# Patient Record
Sex: FEMALE | Race: WHITE | NOT HISPANIC OR LATINO | URBAN - METROPOLITAN AREA
[De-identification: names, ages, dates, MRNs, and addresses within clinical notes are randomized per-mention and may not be internally consistent; named-entity substitution may affect disease eponyms.]

---

## 2020-09-27 ENCOUNTER — EMERGENCY (EMERGENCY)
Facility: HOSPITAL | Age: 35
LOS: 1 days | Discharge: HOME | End: 2020-09-27
Attending: EMERGENCY MEDICINE
Payer: COMMERCIAL

## 2020-09-27 VITALS
OXYGEN SATURATION: 100 % | HEART RATE: 108 BPM | HEIGHT: 69 IN | SYSTOLIC BLOOD PRESSURE: 115 MMHG | TEMPERATURE: 99 F | RESPIRATION RATE: 18 BRPM | WEIGHT: 199.96 LBS | DIASTOLIC BLOOD PRESSURE: 75 MMHG

## 2020-09-27 DIAGNOSIS — N93.9 ABNORMAL UTERINE AND VAGINAL BLEEDING, UNSPECIFIED: ICD-10-CM

## 2020-09-27 LAB
ANION GAP SERPL CALC-SCNC: 10 MMOL/L — SIGNIFICANT CHANGE UP (ref 5–17)
APPEARANCE UR: CLEAR — SIGNIFICANT CHANGE UP
APTT BLD: 27.8 SEC — SIGNIFICANT CHANGE UP (ref 27.5–35.5)
BACTERIA # UR AUTO: NEGATIVE — SIGNIFICANT CHANGE UP
BASOPHILS # BLD AUTO: 0.05 K/UL — SIGNIFICANT CHANGE UP (ref 0–0.2)
BASOPHILS NFR BLD AUTO: 0.6 % — SIGNIFICANT CHANGE UP (ref 0–2)
BILIRUB UR-MCNC: NEGATIVE — SIGNIFICANT CHANGE UP
BLD GP AB SCN SERPL QL: NEGATIVE — SIGNIFICANT CHANGE UP
BUN SERPL-MCNC: 16 MG/DL — SIGNIFICANT CHANGE UP (ref 7–23)
CALCIUM SERPL-MCNC: 8.7 MG/DL — SIGNIFICANT CHANGE UP (ref 8.4–10.5)
CHLORIDE SERPL-SCNC: 105 MMOL/L — SIGNIFICANT CHANGE UP (ref 96–108)
CO2 SERPL-SCNC: 23 MMOL/L — SIGNIFICANT CHANGE UP (ref 22–31)
COLOR SPEC: YELLOW — SIGNIFICANT CHANGE UP
CREAT SERPL-MCNC: 0.99 MG/DL — SIGNIFICANT CHANGE UP (ref 0.5–1.3)
DIFF PNL FLD: ABNORMAL
EOSINOPHIL # BLD AUTO: 0.23 K/UL — SIGNIFICANT CHANGE UP (ref 0–0.5)
EOSINOPHIL NFR BLD AUTO: 2.9 % — SIGNIFICANT CHANGE UP (ref 0–6)
EPI CELLS # UR: 1 /HPF — SIGNIFICANT CHANGE UP
GLUCOSE SERPL-MCNC: 99 MG/DL — SIGNIFICANT CHANGE UP (ref 70–99)
GLUCOSE UR QL: NEGATIVE — SIGNIFICANT CHANGE UP
HCG UR QL: NEGATIVE — SIGNIFICANT CHANGE UP
HCT VFR BLD CALC: 23.5 % — LOW (ref 34.5–45)
HGB BLD-MCNC: 7.6 G/DL — LOW (ref 11.5–15.5)
HYALINE CASTS # UR AUTO: 0 /LPF — SIGNIFICANT CHANGE UP (ref 0–7)
IMM GRANULOCYTES NFR BLD AUTO: 0.4 % — SIGNIFICANT CHANGE UP (ref 0–1.5)
INR BLD: 1.18 RATIO — HIGH (ref 0.88–1.16)
KETONES UR-MCNC: NEGATIVE — SIGNIFICANT CHANGE UP
LEUKOCYTE ESTERASE UR-ACNC: NEGATIVE — SIGNIFICANT CHANGE UP
LYMPHOCYTES # BLD AUTO: 2.55 K/UL — SIGNIFICANT CHANGE UP (ref 1–3.3)
LYMPHOCYTES # BLD AUTO: 32.4 % — SIGNIFICANT CHANGE UP (ref 13–44)
MCHC RBC-ENTMCNC: 29.8 PG — SIGNIFICANT CHANGE UP (ref 27–34)
MCHC RBC-ENTMCNC: 32.3 GM/DL — SIGNIFICANT CHANGE UP (ref 32–36)
MCV RBC AUTO: 92.2 FL — SIGNIFICANT CHANGE UP (ref 80–100)
MONOCYTES # BLD AUTO: 0.43 K/UL — SIGNIFICANT CHANGE UP (ref 0–0.9)
MONOCYTES NFR BLD AUTO: 5.5 % — SIGNIFICANT CHANGE UP (ref 2–14)
NEUTROPHILS # BLD AUTO: 4.57 K/UL — SIGNIFICANT CHANGE UP (ref 1.8–7.4)
NEUTROPHILS NFR BLD AUTO: 58.2 % — SIGNIFICANT CHANGE UP (ref 43–77)
NITRITE UR-MCNC: NEGATIVE — SIGNIFICANT CHANGE UP
NRBC # BLD: 0 /100 WBCS — SIGNIFICANT CHANGE UP (ref 0–0)
PH UR: 6 — SIGNIFICANT CHANGE UP (ref 5–8)
PLATELET # BLD AUTO: 246 K/UL — SIGNIFICANT CHANGE UP (ref 150–400)
POTASSIUM SERPL-MCNC: 3.9 MMOL/L — SIGNIFICANT CHANGE UP (ref 3.5–5.3)
POTASSIUM SERPL-SCNC: 3.9 MMOL/L — SIGNIFICANT CHANGE UP (ref 3.5–5.3)
PROT UR-MCNC: ABNORMAL
PROTHROM AB SERPL-ACNC: 13.9 SEC — HIGH (ref 10.6–13.6)
RBC # BLD: 2.55 M/UL — LOW (ref 3.8–5.2)
RBC # FLD: 11.8 % — SIGNIFICANT CHANGE UP (ref 10.3–14.5)
RBC CASTS # UR COMP ASSIST: 217 /HPF — HIGH (ref 0–4)
RH IG SCN BLD-IMP: NEGATIVE — SIGNIFICANT CHANGE UP
RH IG SCN BLD-IMP: NEGATIVE — SIGNIFICANT CHANGE UP
SODIUM SERPL-SCNC: 138 MMOL/L — SIGNIFICANT CHANGE UP (ref 135–145)
SP GR SPEC: 1.03 — HIGH (ref 1.01–1.02)
UROBILINOGEN FLD QL: NEGATIVE — SIGNIFICANT CHANGE UP
WBC # BLD: 7.86 K/UL — SIGNIFICANT CHANGE UP (ref 3.8–10.5)
WBC # FLD AUTO: 7.86 K/UL — SIGNIFICANT CHANGE UP (ref 3.8–10.5)
WBC UR QL: 1 /HPF — SIGNIFICANT CHANGE UP (ref 0–5)

## 2020-09-27 PROCEDURE — 99220: CPT

## 2020-09-27 PROCEDURE — 99284 EMERGENCY DEPT VISIT MOD MDM: CPT

## 2020-09-27 PROCEDURE — 99244 OFF/OP CNSLTJ NEW/EST MOD 40: CPT

## 2020-09-27 PROCEDURE — 76830 TRANSVAGINAL US NON-OB: CPT | Mod: 26

## 2020-09-27 RX ORDER — ACETAMINOPHEN 500 MG
650 TABLET ORAL ONCE
Refills: 0 | Status: COMPLETED | OUTPATIENT
Start: 2020-09-27 | End: 2020-09-27

## 2020-09-27 RX ADMIN — Medication 650 MILLIGRAM(S): at 21:27

## 2020-09-27 RX ADMIN — Medication 650 MILLIGRAM(S): at 22:30

## 2020-09-27 NOTE — CONSULT NOTE ADULT - PROBLEM SELECTOR RECOMMENDATION 9
Plan  -transfuse 1u pRBC  -f/u post-transfusion CBC   -d/c home with OCP taper: 3 pills once a day for 3 days; 2 pills once a day for 3 days, 1 pill once a day until patient is seen by her OBGYN  -patient to follow up with Dr. Cardona within 1-2 weeks for further outpatient management of abnormal uterine bleeding   -continue management per ED    Pt seen and counseled with Dr. Chan Robyn Frankel PGY-2

## 2020-09-27 NOTE — ED CDU PROVIDER DISPOSITION NOTE - PATIENT PORTAL LINK FT
You can access the FollowMyHealth Patient Portal offered by [facility] by registering at the following website: [email or web address]. By joining [facility]’s FollowMyHealth portal, you will also be able to view your health information using other applications (apps) compatible with our system.

## 2020-09-27 NOTE — ED PROVIDER NOTE - PROGRESS NOTE DETAILS
Spoke with patient regarding finding of fibroid on TVUS - pt understands. Will alert need for transfusion due to low hemoglobin. OB paged. Waiting for callback.   -Cameron Lopez PA-C Spoke with patient regarding finding of fibroid on TVUS - pt understands. Alerted need for transfusion due to low hemoglobin, which agrees and understands. OB paged. Waiting for callback.   -Cameron Lopez PA-C Spoke with OB - will come see pt in ED. Pt sees Dr. Demetra Valadez for GYN outpt.  -Cameron Lopez PA-C

## 2020-09-27 NOTE — ED ADULT NURSE REASSESSMENT NOTE - COMFORT CARE
ambulated to bathroom/po fluids offered/repositioned/plan of care explained/warm blanket provided/darkened lights

## 2020-09-27 NOTE — ED CDU PROVIDER INITIAL DAY NOTE - PROGRESS NOTE DETAILS
CDU PROGRESS NOTE ROLANDO SINGH: Pt arrived in CDU, currently receiving 1st unit PRBC. Pt resting in stretcher in NAD. Case/plan reviewed. Pt without complaints. Will continue to monitor overnight. CDU PROGRESS NOTE PA FRANCISCO: Pt resting comfortably, c/o headache 5/10 in severity. Patient reports having to change pad once since ED transfer to CDU. Will give Tylenol 650mg po and continue to monitor, repeat post transfusion H/H. CDU PROGRESS NOTE ROLANDO SINGH: Pt completed 1 unit PRBC. Will repeat H/H in am.

## 2020-09-27 NOTE — ED PROVIDER NOTE - PHYSICAL EXAMINATION
BRENDON Lopez PA-C see below:  GEN: NAD, awake, eyes open spontaneously  HEENT: NCAT, MMM, Trachea midline, normal conjunctiva, perrl  CHEST/LUNGS: Non-tachypneic, CTAB, bilateral breath sounds  CARDIAC: Non-tachycardic, normal perfusion  ABDOMEN: Soft, +TTP throughout abdomen, nondistended, No rebound/guarding  MSK: No edema, no gross deformity of extremities  SKIN: No rashes, no petechiae, no vesicles  NEURO: CN grossly intact, normal coordination, no focal motor or sensory deficits  PSYCH: Alert, appropriate, cooperative, with capacity and insight   ---------------------------------------------------------------------------- BRENDON Lopez PA-C see below:  GEN: NAD, awake, eyes open spontaneously  HEENT: NCAT, MMM, Trachea midline, normal conjunctiva, perrl  CHEST/LUNGS: Non-tachypneic, CTAB, bilateral breath sounds  CARDIAC: Non-tachycardic, normal perfusion  ABDOMEN: Soft, +TTP throughout abdomen, nondistended, No rebound/guarding  PELVIC: Chaperone: Valencia Dean RN. External genitalia unremarkable, no erythema, no vesicles, no lesions, + mild vaginal bleeding at cervix, cervix w/o erythema, non-friable, nonparous os. No CMT, adnexa not palpable, no cysts or masses. Exam was well tolerated.   MSK: No edema, no gross deformity of extremities  SKIN: No rashes, no petechiae, no vesicles  NEURO: CN grossly intact, normal coordination, no focal motor or sensory deficits  PSYCH: Alert, appropriate, cooperative, with capacity and insight   ----------------------------------------------------------------------------

## 2020-09-27 NOTE — CONSULT NOTE ADULT - SUBJECTIVE AND OBJECTIVE BOX
**INCOMPLETE**    R2 GYN CONSULT NOTE    EUGENE PRATT  35y  Female [id number]    HPI:36yo GP LMP presenting with vaginal bleeding x5 days.         Primary OBGYN: Dr. Farley    POB:    PGYN: Denies fibroids, cysts, endometriosis, STI's, Abnormal pap smears   PMH:   PSH:   MEDS:  ALL: No Known Allergies  SOCHx: neg x 3  FH:   No h/o VTE. No h/o familial or gyn cancers (no breast, ovarian, uterine, gastric, pancreatic, skin, or eye. FAMILY HISTORY:      ROS: neg except as noted in [facility]    Hospital Meds:   MEDICATIONS  (STANDING):    MEDICATIONS  (PRN):        Vital Signs Last 24 Hrs  T(C): 37.3 (27 Sep 2020 13:46), Max: 37.3 (27 Sep 2020 13:46)  T(F): 99.1 (27 Sep 2020 13:46), Max: 99.1 (27 Sep 2020 13:46)  HR: 108 (27 Sep 2020 13:46) (108 - 108)  BP: 115/75 (27 Sep 2020 13:46) (115/75 - 115/75)  BP(mean): --  RR: 18 (27 Sep 2020 13:46) (18 - 18)  SpO2: 100% (27 Sep 2020 13:46) (100% - 100%)    Physical Exam:   General: sitting comfortably in bed, in NAD   HEENT: neck supple, full ROM, moist mucuous membranes  CV: RRR, nl S1S2 no m/r/g  Lungs: CTA b/l, good air flow b/l   Back: No CVA tenderness  Abd: Soft, non-tender, non-distended.  Normoactive bowel sounds.  : No bleeding on pad. External labia wnl.    Speculum Exam: Scant blood in vault. No active bleeding from os.  Physiologic discharge.    Bimanual Exam: No cervical motion tenderness. Uterus wnl. Adnexae nonpalpable bilaterally. Cervix closed v Cervix dilated to XXX  Ext: non-tender b/l, no edema     LABS:              Pregnancy Profile, Urine: Negative (20 @ 16:06)                          7.6    7.86  )-----------( 246      ( 27 Sep 2020 14:36 )             23.5         138  |  105  |  16  ----------------------------<  99  3.9   |  23  |  0.99    Ca    8.7      27 Sep 2020 14:36      I&O's Detail    PT/INR - ( 27 Sep 2020 14:36 )   PT: 13.9 sec;   INR: 1.18 ratio         PTT - ( 27 Sep 2020 14:36 )  PTT:27.8 sec  Urinalysis Basic - ( 27 Sep 2020 16:06 )    Color: Yellow / Appearance: Clear / S.028 / pH: x  Gluc: x / Ketone: Negative  / Bili: Negative / Urobili: Negative   Blood: x / Protein: 30 mg/dL / Nitrite: Negative   Leuk Esterase: Negative / RBC: 217 /hpf / WBC 1 /HPF   Sq Epi: x / Non Sq Epi: 1 /hpf / Bacteria: Negative        RADIOLOGY & ADDITIONAL STUDIES:    EXAM:  US TRANSVAGINAL                            PROCEDURE DATE:  2020            INTERPRETATION:  CLINICAL INFORMATION: Heavy vaginal bleeding and clots. Dizziness and fatigue.    LMP: 2 weeks ago    COMPARISON: None available.    TECHNIQUE:  Endovaginal pelvic sonogram as per order. Transabdominal pelvic sonogram was also performed as part of our standard protocol. Color and Spectral Doppler was performed.    FINDINGS:    Uterus: 6.9 x 3.4 x 4.4 cm. Left anterior intramural uterine fibroid measures 1.4 x 1.0 x 1.4 cm. Hypoechoic rounded focus along the right broad ligament measuring 1.1 x 1.1 x 0.9 cm, likely fibroid and less likely ectopic pregnancy.  Endometrium: 9 mm. Hyperechoic material in the endometrial canal, likely hemorrhage. No definite focal lesions.    Right ovary: 4.5 x 2.4 x 2.5 cm. Corpus luteum measures 2.3 x 1.5 x 1.7. Normal arterial and venous waveforms.  Left ovary: 2.6 x 1.6 x 1.6 cm. Within normal limits. Normal arterial and venous waveforms.    Fluid: Trace free fluid.    IMPRESSION:  Hypoechoic rounded focus along the right broad ligament measuring 1.1 x 1.1 x 0.9 cm, likely fibroid and less likely ectopic pregnancy. Correlate with pending beta hCG.    Blood within the endometrial canal.              SAVANNAH CHANG M.D., RADIOLOGY RESIDENT  This document has been electronically signed.  JALEEL ROSSI M.D., ATTENDING RADIOLOGIST  This document has been electronically signed. Sep 27 2020  3:53PM R2 GYN CONSULT NOTE    EUGENE PRATT  35y  Female [id number]    HPI: 34yo  LMP  presenting with vaginal bleeding x5 days. The patient states she has been passing large, fist-sized clots. She has been using 1 pad/hr. She has also had right sided cramping pelvic pain for the same duration. She has taken Advil and Midol with good pain relief. She endorses fatigue, lightheadedness, dizziness, and nausea. Has not had any emesis. Denies fevers, chills, chest pain, SOB, syncope, dysuria, or vaginal discharge. She was seen at her private OBGYN last week, where she had a brief ultrasound; she was told she had a right sided ovarian cyst and was sent home.     Primary OBGYN: Dr. 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; denies fibroids, endometriosis, Abnormal pap smears   PMH: Denies  PSH: Rhinoplasty, Septoplasty, lasik eye surgery, breast augmentation   MEDS: Valtrex  ALL: No Known Allergies  SOCHx: neg x 3  FH: Maternal aunt: breast cancer      ROS: neg except as noted in HPI    Hospital Meds:   MEDICATIONS  (STANDING):    MEDICATIONS  (PRN):        Vital Signs Last 24 Hrs  T(C): 37.3 (27 Sep 2020 13:46), Max: 37.3 (27 Sep 2020 13:46)  T(F): 99.1 (27 Sep 2020 13:46), Max: 99.1 (27 Sep 2020 13:46)  HR: 108 (27 Sep 2020 13:46) (108 - 108)  BP: 115/75 (27 Sep 2020 13:46) (115/75 - 115/75)  BP(mean): --  RR: 18 (27 Sep 2020 13:46) (18 - 18)  SpO2: 100% (27 Sep 2020 13:46) (100% - 100%)    Physical Exam:   General: sitting comfortably in bed, in NAD   HEENT: neck supple, full ROM, moist mucuous membranes  CV: RRR, nl S1S2 no m/r/g  Lungs: CTA b/l, good air flow b/l   Abd: Soft, non-distended, mildly tender to palpation in RLQ>LLQ.  Normoactive bowel sounds.  : moderate bleeding on pad. External labia wnl.    Speculum Exam: 2 3cm clots evacuated from the vault. Minimal active bleeding from os.  Physiologic discharge.    Bimanual Exam: No cervical motion tenderness. Uterus wnl. Adnexae nonpalpable bilaterally. Cervix closed.  Ext: non-tender b/l, no edema     LABS:    Pregnancy Profile, Urine: Negative (20 @ 16:06)                          7.6    7.86  )-----------( 246      ( 27 Sep 2020 14:36 )             23.5         138  |  105  |  16  ----------------------------<  99  3.9   |  23  |  0.99    Ca    8.7      27 Sep 2020 14:36      I&O's Detail    PT/INR - ( 27 Sep 2020 14:36 )   PT: 13.9 sec;   INR: 1.18 ratio         PTT - ( 27 Sep 2020 14:36 )  PTT:27.8 sec  Urinalysis Basic - ( 27 Sep 2020 16:06 )    Color: Yellow / Appearance: Clear / S.028 / pH: x  Gluc: x / Ketone: Negative  / Bili: Negative / Urobili: Negative   Blood: x / Protein: 30 mg/dL / Nitrite: Negative   Leuk Esterase: Negative / RBC: 217 /hpf / WBC 1 /HPF   Sq Epi: x / Non Sq Epi: 1 /hpf / Bacteria: Negative        RADIOLOGY & ADDITIONAL STUDIES:    EXAM:  US TRANSVAGINAL                            PROCEDURE DATE:  2020            INTERPRETATION:  CLINICAL INFORMATION: Heavy vaginal bleeding and clots. Dizziness and fatigue.    LMP: 2 weeks ago    COMPARISON: None available.    TECHNIQUE:  Endovaginal pelvic sonogram as per order. Transabdominal pelvic sonogram was also performed as part of our standard protocol. Color and Spectral Doppler was performed.    FINDINGS:    Uterus: 6.9 x 3.4 x 4.4 cm. Left anterior intramural uterine fibroid measures 1.4 x 1.0 x 1.4 cm. Hypoechoic rounded focus along the right broad ligament measuring 1.1 x 1.1 x 0.9 cm, likely fibroid and less likely ectopic pregnancy.  Endometrium: 9 mm. Hyperechoic material in the endometrial canal, likely hemorrhage. No definite focal lesions.    Right ovary: 4.5 x 2.4 x 2.5 cm. Corpus luteum measures 2.3 x 1.5 x 1.7. Normal arterial and venous waveforms.  Left ovary: 2.6 x 1.6 x 1.6 cm. Within normal limits. Normal arterial and venous waveforms.    Fluid: Trace free fluid.    IMPRESSION:  Hypoechoic rounded focus along the right broad ligament measuring 1.1 x 1.1 x 0.9 cm, likely fibroid and less likely ectopic pregnancy. Correlate with pending beta hCG.    Blood within the endometrial canal.              SAVANNAH CHANG M.D., RADIOLOGY RESIDENT  This document has been electronically signed.  JALEEL ROSSI M.D., ATTENDING RADIOLOGIST  This document has been electronically signed. Sep 27 2020  3:53PM

## 2020-09-27 NOTE — ED CDU PROVIDER DISPOSITION NOTE - NSFOLLOWUPINSTRUCTIONS_ED_ALL_ED_FT
1) Follow-up with your OB/GYN this week, Call today / next business day for prompt follow-up.  2) Take New prescription (combined OCP) taper 3 tabs x 3 days, then 2 tabs x 3 days, followed by 1 tab daily until follow up with your GYN.  3) See attached instruction sheets for additional information, including information regarding signs and symptoms to look out for, reasons to seek immediate care and other important instructions.  4) Return to Emergency room for any worsening or persistent pain, weakness, fever, or any other concerning symptoms.

## 2020-09-27 NOTE — ED PROVIDER NOTE - OBJECTIVE STATEMENT
35y F  (elective  11y ago) p/w vaginal bleeding x5 days. Pt states 5 days ago she started bleeding with use of 1pad per hour for 5 days. Pt states she went to GYN doctor on Thursday who performed an ultrasound, was dx with hemorrhagic cyst and sent home- pt coming in today due to bleeding not stopping. Pt sexually active in a monogamous relationship with boyfriend- does not use protection. Last menses 2wks ago - symptoms different than her normal period. Pt also endorses HA/dizziness, nausea, lower abdominal pain/lower back pain. Pt denies fever/chills, visual disturbances, chest pain/SOB, V/D, dysuria.

## 2020-09-27 NOTE — ED PROVIDER NOTE - ATTENDING CONTRIBUTION TO CARE
34 y/o f with pmhx  s/p elective TOP 10+ yrs ago, presents for vaginal bleeding. LMP was 5 days ago. Seen at Gyn this week and had some tests done  including US. bleeding still continued so came to ED. using approx 1 pad per hr. Sexually active 1 partner, male without prot. no dizziness no chest pain no shortness of breath.   Gen.  tearful and anxious female. no respiratory distress  HEENT:  perrl eomi  Lungs:  b/l bs  CVS: S1S2 tachy 110  Abd;  soft non tender no distention  Ext: no pitting edema or erythema   exam done by PA with nurse.   Neuro: aaox3 no focal deficits  MSK: strength 5/5 b/l upper and lower ext.

## 2020-09-27 NOTE — ED ADULT NURSE NOTE - NSIMPLEMENTINTERV_GEN_ALL_ED
36.6
Implemented All Universal Safety Interventions:  [place] to call system. Call bell, personal items and telephone within reach. Instruct patient to call for assistance. Room bathroom lighting operational. Non-slip footwear when patient is off stretcher. Physically safe environment: no spills, clutter or unnecessary equipment. Stretcher in lowest position, wheels locked, appropriate side rails in place.

## 2020-09-27 NOTE — ED CDU PROVIDER DISPOSITION NOTE - CLINICAL COURSE
35y F  (elective  11y ago) p/w vaginal bleeding x5 days. Pt states 5 days ago she started bleeding with use of 1pad per hour for 5 days. Pt states she went to GYN doctor on Thursday who performed an ultrasound, was dx with hemorrhagic cyst and sent home. patient with no syncope/sob or chest pain but is tachycardic on evaluation in the ED. hgb 7.6. patient seen by OB/GYN and recommended for transfusion given persistent tachycardia and active bleeding. patient to have post dc cbc and tele monitoring. during CDU stay 35y F  (elective  11y ago) p/w vaginal bleeding x5 days. Pt states 5 days ago she started bleeding with use of 1pad per hour for 5 days. Pt states she went to GYN doctor on Thursday who performed an ultrasound, was dx with hemorrhagic cyst and sent home. patient with no syncope/sob or chest pain but is tachycardic on evaluation in the ED. hgb 7.6. patient seen by OB/GYN and recommended for transfusion given persistent tachycardia and active bleeding. Patient completed 1 unit PRBC, Repeat H/H post 1 unit PRBC from 7.6/23.5---8.4/25.4. Patient evaluated by OB.GYN and cleared for d/c, OCP sent to Pharmacy. Pt instructed to take combined OCP taper 3tabs x3 days, 2 tabs x3 days, 1 tab daily until follow up with her GYN this week. c/d/w Dr. Watson, stable for d/c.

## 2020-09-27 NOTE — ED CDU PROVIDER DISPOSITION NOTE - CARE PROVIDER_API CALL
Mary Alice Farley  OBSTETRICS AND GYNECOLOGY  [address]  [place] [address]  Phone: [phone]  Fax: [phone]  Follow Up Time:

## 2020-09-27 NOTE — ED CDU PROVIDER DISPOSITION NOTE - ATTENDING CONTRIBUTION TO CARE
Attending Statement (REMEDIOS Watson MD):    HPI: 34y/o F , s/p elective TOP 11y/ago, who presented to the ED last night with vaginal bleeding for 5 days; states previously diagnosed with ovarian hemorrhagic cyst; LMP 2 weeks ago; patient also reported ahving dizziness/lightheadedness, mild gradual onset Ha and nausea; some lower abdominal/pelvic and back pain; now feeling much better.  Was evaluated by OBGYN and had labs and US (results reviewed: found to have anemia w/ Hgb 7.6; US shows blood in endometrial canal and likely fibroid: UCG negative at present not concerning for ectopic pregnancy).  In CDU s/p transfusion of 1 U PRBC; repeat Hgb 3 h post transfusion completion shows appropriate rise in Hgb/Hct.  Per further d/w OBGYN stated patent stable for dc, to start OCPs (resident sent Rx to her pharmacy)    Review of Systems:  -General: no fever or chills  -ENT: no congestion, no difficulty swallowing  -Pulmonary: no cough, no shortness of breath  -Cardiac: no chest pain, no palpitations  -Gastrointestinal: no abdominal pain, no nausea, no vomiting, and no diarrhea.  -Genitourinary: no blood or pain with urination; + vaginal bleeding (reports that this has slowed signficantly since arrival to ED)  -Musculoskeletal: no back or neck pain  -Skin: no rashes  -Endocrine: No h/o diabetes or thyroid disease  -Neurologic: No focal weakness or numbness    All else negative unless otherwise specified elsewhere in this note.    PSH/PMH as noted above    On Physical Exam:  General: well appearing, in NAD, speaking clearly in full sentences and without difficulty; cooperative with exam  HEENT: anicteric, airway patent  Neck: no neck tenderness, no nuchal rigidity  Cardiac: RRR  Lungs: CTABL  Abdomen: soft nontender/nondistended  : no bladder tenderness or distension  Extremities: no peripheral edema, no gross deformities  Neuro: no gross neurologic deficits    MDM: Dysfunctional uterine bleeding; management as per OBGYN; recommendations d/w patient: Results of ED evaluation including labs d/w patient, who verbalizes understanding of ED evaluation and discharge plan including medications, follow-up instructions and return precautions.   Stable for dc.

## 2020-09-27 NOTE — ED ADULT NURSE NOTE - OBJECTIVE STATEMENT
34 yo F aaox4 c/o of vaginal bleeding. 34 yo F aaox4 c/o of vaginal bleeding, and lower abd pain onset 5 days ago. Pt also c/o of dizziness, and headache. Denies Cp weakness or sob. No Gu symptoms noted. No NVD. Denies fever or chills. Provider at bedside evaluating. IV line placed labs drawn and sent.

## 2020-09-27 NOTE — CONSULT NOTE ADULT - ATTENDING COMMENTS
GYN ATTENDING ADDENDUM  I have seen and evaluated the patient, read the above note, and agree with resident assessment and plan with the following additions/exceptions:     Ms. Benson is a 34yo  UPT negative, LMP  who presents with VB x5 days a/w lightheadedness, dizziness, and nausea. AF, VS notable for mild tachycardia. Exam without active vaginal bleeding. LAbs notable for Hct 23.5 otherwise reassuring; O-. TVUS with 9mm trilaminar stripe, relatively thin. No structural causes for intermenstrual AUB at this time. Given thin stripe, recommend combined oral contraceptive taper. Patient without risk factors for VTE or contraindications to estrogen containing components. Agree with ED plan for transfusion 1u pRBC for symptomatic acute blood loss anemia  - combined OCP taper 3tabs x3 days, 2 tabs x3 days, 1 tab daily until follow up with her GYN (please order monophasic c-OCP with ideally 35mcg of ethinyl estradiol)  - 1u pRBC per ED  - patient counseled to follow up with primary GYN in 1-2 weeks    Raquel

## 2020-09-27 NOTE — ED CDU PROVIDER INITIAL DAY NOTE - ATTENDING CONTRIBUTION TO CARE
36 y/o f with pmhx  s/p elective TOP 10+ yrs ago, presents for vaginal bleeding. LMP was 5 days ago. Seen at Gyn this week and had some tests done  including US. bleeding still continued so came to ED. using approx 1 pad per hr. Sexually active 1 partner, male without prot. no dizziness no chest pain no shortness of breath.   Gen.  tearful and anxious female. no respiratory distress  HEENT:  perrl eomi  Lungs:  b/l bs  CVS: S1S2 tachy 110  Abd;  soft non tender no distention  Ext: no pitting edema or erythema   exam done by PA with nurse.   Neuro: aaox3 no focal deficits  MSK: strength 5/5 b/l upper and lower ext.

## 2020-09-27 NOTE — ED PROVIDER NOTE - CLINICAL SUMMARY MEDICAL DECISION MAKING FREE TEXT BOX
ATTG: : vaginal bleeding with persistent episodes and tachycardia, concrn for anemia. unclear cause, will check hcg, check labs, check urine, check US, and re eval for dispo

## 2020-09-27 NOTE — CONSULT NOTE ADULT - ASSESSMENT
A/P 35y  LMP   who presented with heavy vaginal bleeding and pelvic cramping x5 days, found to have symptomatic anemia with H/H of 7.6/23. TVUS consistent with multiple small intramural/broad ligament fibroids; no intracavitary lesions visualized. Etiology of vaginal bleeding unclear at this time given ultrasound findings. Will give blood transfusion for anemia and start on OCP taper for vaginal bleeding. Patient will require close follow up outpatient to determine future workup and management

## 2020-09-28 VITALS
RESPIRATION RATE: 17 BRPM | OXYGEN SATURATION: 99 % | TEMPERATURE: 98 F | HEART RATE: 81 BPM | SYSTOLIC BLOOD PRESSURE: 99 MMHG | DIASTOLIC BLOOD PRESSURE: 64 MMHG

## 2020-09-28 LAB
BASOPHILS # BLD AUTO: 0.04 K/UL — SIGNIFICANT CHANGE UP (ref 0–0.2)
BASOPHILS NFR BLD AUTO: 0.6 % — SIGNIFICANT CHANGE UP (ref 0–2)
EOSINOPHIL # BLD AUTO: 0.28 K/UL — SIGNIFICANT CHANGE UP (ref 0–0.5)
EOSINOPHIL NFR BLD AUTO: 4.4 % — SIGNIFICANT CHANGE UP (ref 0–6)
HCT VFR BLD CALC: 25.4 % — LOW (ref 34.5–45)
HGB BLD-MCNC: 8.4 G/DL — LOW (ref 11.5–15.5)
IMM GRANULOCYTES NFR BLD AUTO: 0.2 % — SIGNIFICANT CHANGE UP (ref 0–1.5)
LYMPHOCYTES # BLD AUTO: 2.86 K/UL — SIGNIFICANT CHANGE UP (ref 1–3.3)
LYMPHOCYTES # BLD AUTO: 44.5 % — HIGH (ref 13–44)
MCHC RBC-ENTMCNC: 30.1 PG — SIGNIFICANT CHANGE UP (ref 27–34)
MCHC RBC-ENTMCNC: 33.1 GM/DL — SIGNIFICANT CHANGE UP (ref 32–36)
MCV RBC AUTO: 91 FL — SIGNIFICANT CHANGE UP (ref 80–100)
MONOCYTES # BLD AUTO: 0.46 K/UL — SIGNIFICANT CHANGE UP (ref 0–0.9)
MONOCYTES NFR BLD AUTO: 7.2 % — SIGNIFICANT CHANGE UP (ref 2–14)
NEUTROPHILS # BLD AUTO: 2.77 K/UL — SIGNIFICANT CHANGE UP (ref 1.8–7.4)
NEUTROPHILS NFR BLD AUTO: 43.1 % — SIGNIFICANT CHANGE UP (ref 43–77)
NRBC # BLD: 0 /100 WBCS — SIGNIFICANT CHANGE UP (ref 0–0)
PLATELET # BLD AUTO: 193 K/UL — SIGNIFICANT CHANGE UP (ref 150–400)
RBC # BLD: 2.79 M/UL — LOW (ref 3.8–5.2)
RBC # FLD: 12 % — SIGNIFICANT CHANGE UP (ref 10.3–14.5)
WBC # BLD: 6.42 K/UL — SIGNIFICANT CHANGE UP (ref 3.8–10.5)
WBC # FLD AUTO: 6.42 K/UL — SIGNIFICANT CHANGE UP (ref 3.8–10.5)

## 2020-09-28 PROCEDURE — 85025 COMPLETE CBC W/AUTO DIFF WBC: CPT

## 2020-09-28 PROCEDURE — G0378: CPT

## 2020-09-28 PROCEDURE — 81025 URINE PREGNANCY TEST: CPT

## 2020-09-28 PROCEDURE — 99217: CPT

## 2020-09-28 PROCEDURE — P9016: CPT

## 2020-09-28 PROCEDURE — 85730 THROMBOPLASTIN TIME PARTIAL: CPT

## 2020-09-28 PROCEDURE — 99285 EMERGENCY DEPT VISIT HI MDM: CPT | Mod: 25

## 2020-09-28 PROCEDURE — 81001 URINALYSIS AUTO W/SCOPE: CPT

## 2020-09-28 PROCEDURE — 86900 BLOOD TYPING SEROLOGIC ABO: CPT

## 2020-09-28 PROCEDURE — 36415 COLL VENOUS BLD VENIPUNCTURE: CPT

## 2020-09-28 PROCEDURE — 99218: CPT

## 2020-09-28 PROCEDURE — 85610 PROTHROMBIN TIME: CPT

## 2020-09-28 PROCEDURE — 86850 RBC ANTIBODY SCREEN: CPT

## 2020-09-28 PROCEDURE — 86923 COMPATIBILITY TEST ELECTRIC: CPT

## 2020-09-28 PROCEDURE — 80048 BASIC METABOLIC PNL TOTAL CA: CPT

## 2020-09-28 PROCEDURE — 36430 TRANSFUSION BLD/BLD COMPNT: CPT

## 2020-09-28 PROCEDURE — 86901 BLOOD TYPING SEROLOGIC RH(D): CPT

## 2020-09-28 PROCEDURE — 76830 TRANSVAGINAL US NON-OB: CPT

## 2020-09-28 RX ORDER — ACETAMINOPHEN 500 MG
650 TABLET ORAL ONCE
Refills: 0 | Status: COMPLETED | OUTPATIENT
Start: 2020-09-28 | End: 2020-09-28

## 2020-09-28 RX ORDER — ETHYNODIOL DIACETATE AND ETHINYL ESTRADIOL 1 MG-50MCG
1 KIT ORAL
Qty: 30 | Refills: 2
Start: 2020-09-28

## 2020-09-28 RX ADMIN — Medication 650 MILLIGRAM(S): at 03:39

## 2020-09-28 NOTE — ED ADULT NURSE REASSESSMENT NOTE - NS ED NURSE REASSESS COMMENT FT1
Pt d/c home per PA Mukesh, VSS, no complaints, IV lock removed, d/c paperwork given to pt by PA. Ambulated out of unit independently left hospital via private car. Left with own belongings.
PtA +Ox3, 15 minute blood transfusion VSS. Pt aware of plan of care, to CDU. Report given to Nan RN by STEPHANY Torres.
Report received from STEPHANY Torres. Pt PETRONA+Larry, CHRIST, presented to ED c/o vaginal bleeding. Pt's hemoglobin 7.6. One unit PRBC's initiated by 2 ED RN's as ordered. Pt aware of plan of care, to observation unit.
Received pt from RN Brian , received pt alert and responsive, oriented x4, denies any respiratory distress, SOB, or difficulty breathing. Pt transferred to CDU for vaginal bleeding. Pt received on 1 unit PRBC pt tolerating well. Pending CBC 3 hours post completion, pt aware of plan. pt is currently asymptomatic at this time with no complaints. IV in place, patent and free of signs of infiltration, placed on continuous cardiac monitoring as ordered, NSR HR 90's.  pt denies chest pain or palpitations, V/S stable, pt afebrile, pt denies pain at this time. Pt educated on unit and unit rules, instructed patient to notify RN of any needed assistance, Pt verbalizes understanding, Call bell placed within reach. Safety maintained. Will continue to monitor.

## 2020-09-28 NOTE — CHART NOTE - NSCHARTNOTEFT_GEN_A_CORE
R2 Gyn Chart Note     Notified by CDU team that pt had repeat CBC s/p 1u PRBC, with appropriate rise as below and plan for discharge at this time.                8.4    6.42  )-----------( 193      ( 09-28 @ 02:04 )             25.4                7.6    7.86  )-----------( 246      ( 09-27 @ 14:36 )             23.5     Prescription sent to pharmacy for OCP as recommended by Gyn team on consult. CDU to include specific taper instructions as follows in discharge summary:  - Combined OCP taper 3tabs x3 days, 2 tabs x3 days, 1 tab daily until follow up with GYN   Pt to follow up in 1-2 weeks with Ob/Gyn    Plan per Dr. Linette Whatley PGY2

## 2020-09-28 NOTE — ED CDU PROVIDER SUBSEQUENT DAY NOTE - HISTORY
CDU PROGRESS NOTE PA FRANCISCO: Pt resting comfortably, feeling well without complaint. Repeat H/H post 1 unit PRBC from 7.6/23.5---8.4/25.4.

## 2020-09-28 NOTE — ED CDU PROVIDER SUBSEQUENT DAY NOTE - ATTENDING CONTRIBUTION TO CARE
Attending Contribution to Care: Attending Statement (REMEDIOS Watson MD):    HPI: 34y/o F , s/p elective TOP 11y/ago, who presented to the ED last night with vaginal bleeding for 5 days; states previously diagnosed with ovarian hemorrhagic cyst; LMP 2 weeks ago; patient also reported ahving dizziness/lightheadedness, mild gradual onset Ha and nausea; some lower abdominal/pelvic and back pain; now feeling much better.  Was evaluated by OBGYN and had labs and US (results reviewed: found to have anemia w/ Hgb 7.6; US shows blood in endometrial canal and likely fibroid: UCG negative at present not concerning for ectopic pregnancy).  In CDU s/p transfusion of 1 U PRBC; repeat Hgb 3 h post transfusion completion shows appropriate rise in Hgb/Hct.  Per further d/w OBGYN stated patent stable for dc, to start OCPs (resident sent Rx to her pharmacy)    Review of Systems:  -General: no fever or chills  -ENT: no congestion, no difficulty swallowing  -Pulmonary: no cough, no shortness of breath  -Cardiac: no chest pain, no palpitations  -Gastrointestinal: no abdominal pain, no nausea, no vomiting, and no diarrhea.  -Genitourinary: no blood or pain with urination; + vaginal bleeding (reports that this has slowed signficantly since arrival to ED)  -Musculoskeletal: no back or neck pain  -Skin: no rashes  -Endocrine: No h/o diabetes or thyroid disease  -Neurologic: No focal weakness or numbness    All else negative unless otherwise specified elsewhere in this note.    PSH/PMH as noted above    On Physical Exam:  General: well appearing, in NAD, speaking clearly in full sentences and without difficulty; cooperative with exam  HEENT: anicteric, airway patent  Neck: no neck tenderness, no nuchal rigidity  Cardiac: RRR  Lungs: CTABL  Abdomen: soft nontender/nondistended  : no bladder tenderness or distension  Extremities: no peripheral edema, no gross deformities  Neuro: no gross neurologic deficits    MDM: Dysfunctional uterine bleeding; management as per OBGYN; recommendations d/w patient: Results of ED evaluation including labs d/w patient, who verbalizes understanding of ED evaluation and discharge plan including medications, follow-up instructions and return precautions.   Stable for dc.

## 2020-09-28 NOTE — ED CDU PROVIDER SUBSEQUENT DAY NOTE - PROGRESS NOTE DETAILS
CDU PROGRESS NOTE ROLANDO SINGH: Prescription sent to pharmacy, patient instructed to take combined OCP taper 3tabs x3 days, 2 tabs x3 days, 1 tab daily until follow up with GYN. Pt to follow up in 1-2 weeks with Ob/Gyn. Patient is aox3, speaking full coherent sentences with no signs of distress noted. Vitals within normal limits, patient reports changing pad x 3 w/ bright red blood, but states feeling better and without complaints. c/d/w Dr. Watson, stable for d/c.

## 2022-05-04 NOTE — ED ADULT NURSE NOTE - CAS DISCH CONDITION
[Time Spent: ___ minutes] : I have spent [unfilled] minutes of time on the encounter. [>50% of the face to face encounter time was spent on counseling and/or coordination of care for ___] : Greater than 50% of the face to face encounter time was spent on counseling and/or coordination of care for [unfilled] Stable

## 2024-02-12 PROBLEM — Z78.9 OTHER SPECIFIED HEALTH STATUS: Chronic | Status: ACTIVE | Noted: 2020-09-27

## 2024-02-21 PROBLEM — Z00.00 ENCOUNTER FOR PREVENTIVE HEALTH EXAMINATION: Status: ACTIVE | Noted: 2024-02-21

## 2024-02-27 ENCOUNTER — TRANSCRIPTION ENCOUNTER (OUTPATIENT)
Age: 39
End: 2024-02-27

## 2024-02-27 ENCOUNTER — APPOINTMENT (OUTPATIENT)
Dept: HUMAN REPRODUCTION | Facility: CLINIC | Age: 39
End: 2024-02-27
Payer: COMMERCIAL

## 2024-02-27 PROCEDURE — 76830 TRANSVAGINAL US NON-OB: CPT

## 2024-02-27 PROCEDURE — 36415 COLL VENOUS BLD VENIPUNCTURE: CPT

## 2024-02-27 PROCEDURE — 99205 OFFICE O/P NEW HI 60 MIN: CPT | Mod: 25

## 2024-03-13 ENCOUNTER — APPOINTMENT (OUTPATIENT)
Dept: HUMAN REPRODUCTION | Facility: CLINIC | Age: 39
End: 2024-03-13
Payer: COMMERCIAL

## 2024-03-13 PROCEDURE — 76831 ECHO EXAM UTERUS: CPT

## 2024-03-13 PROCEDURE — 99213 OFFICE O/P EST LOW 20 MIN: CPT | Mod: 25

## 2024-03-13 PROCEDURE — 58340 CATHETER FOR HYSTEROGRAPHY: CPT

## 2024-03-13 PROCEDURE — 58999I: CUSTOM

## 2024-03-13 PROCEDURE — 74740 X-RAY FEMALE GENITAL TRACT: CPT

## 2024-03-15 ENCOUNTER — APPOINTMENT (OUTPATIENT)
Dept: MRI IMAGING | Facility: CLINIC | Age: 39
End: 2024-03-15
Payer: COMMERCIAL

## 2024-03-15 ENCOUNTER — RESULT REVIEW (OUTPATIENT)
Age: 39
End: 2024-03-15

## 2024-03-15 PROCEDURE — A9585: CPT | Mod: JW

## 2024-03-15 PROCEDURE — 70553 MRI BRAIN STEM W/O & W/DYE: CPT

## 2024-04-10 ENCOUNTER — APPOINTMENT (OUTPATIENT)
Dept: HUMAN REPRODUCTION | Facility: CLINIC | Age: 39
End: 2024-04-10
Payer: COMMERCIAL

## 2024-04-10 PROCEDURE — 36415 COLL VENOUS BLD VENIPUNCTURE: CPT

## 2024-04-12 ENCOUNTER — APPOINTMENT (OUTPATIENT)
Dept: HUMAN REPRODUCTION | Facility: CLINIC | Age: 39
End: 2024-04-12
Payer: COMMERCIAL

## 2024-04-12 PROCEDURE — 58558Z: CUSTOM

## 2024-04-12 PROCEDURE — 76998 US GUIDE INTRAOP: CPT

## 2024-05-02 ENCOUNTER — APPOINTMENT (OUTPATIENT)
Dept: HUMAN REPRODUCTION | Facility: CLINIC | Age: 39
End: 2024-05-02

## 2024-05-02 PROCEDURE — 99215 OFFICE O/P EST HI 40 MIN: CPT | Mod: 1L

## 2024-05-03 ENCOUNTER — APPOINTMENT (OUTPATIENT)
Dept: HUMAN REPRODUCTION | Facility: CLINIC | Age: 39
End: 2024-05-03
Payer: COMMERCIAL

## 2024-05-03 PROCEDURE — 36415 COLL VENOUS BLD VENIPUNCTURE: CPT

## 2024-05-03 PROCEDURE — 99213 OFFICE O/P EST LOW 20 MIN: CPT | Mod: 25

## 2024-05-03 PROCEDURE — 99459 PELVIC EXAMINATION: CPT

## 2024-05-03 PROCEDURE — 76857 US EXAM PELVIC LIMITED: CPT

## 2024-05-06 ENCOUNTER — APPOINTMENT (OUTPATIENT)
Dept: HUMAN REPRODUCTION | Facility: CLINIC | Age: 39
End: 2024-05-06
Payer: COMMERCIAL

## 2024-05-06 PROCEDURE — 36415 COLL VENOUS BLD VENIPUNCTURE: CPT

## 2024-05-06 PROCEDURE — 99213 OFFICE O/P EST LOW 20 MIN: CPT | Mod: 25

## 2024-05-06 PROCEDURE — 76857 US EXAM PELVIC LIMITED: CPT

## 2024-05-28 ENCOUNTER — APPOINTMENT (OUTPATIENT)
Dept: HUMAN REPRODUCTION | Facility: CLINIC | Age: 39
End: 2024-05-28
Payer: COMMERCIAL

## 2024-05-28 PROCEDURE — 99213 OFFICE O/P EST LOW 20 MIN: CPT | Mod: 25

## 2024-05-28 PROCEDURE — 76830 TRANSVAGINAL US NON-OB: CPT

## 2024-05-28 PROCEDURE — 36415 COLL VENOUS BLD VENIPUNCTURE: CPT

## 2024-05-28 PROCEDURE — S4042: CPT

## 2024-06-04 ENCOUNTER — APPOINTMENT (OUTPATIENT)
Dept: HUMAN REPRODUCTION | Facility: CLINIC | Age: 39
End: 2024-06-04
Payer: COMMERCIAL

## 2024-06-04 PROCEDURE — 76857 US EXAM PELVIC LIMITED: CPT

## 2024-06-04 PROCEDURE — 99213 OFFICE O/P EST LOW 20 MIN: CPT | Mod: 25

## 2024-06-04 PROCEDURE — 36415 COLL VENOUS BLD VENIPUNCTURE: CPT

## 2024-06-06 ENCOUNTER — APPOINTMENT (OUTPATIENT)
Dept: HUMAN REPRODUCTION | Facility: CLINIC | Age: 39
End: 2024-06-06
Payer: COMMERCIAL

## 2024-06-06 PROCEDURE — 58322 ARTIFICIAL INSEMINATION: CPT

## 2024-06-06 PROCEDURE — 89260 SPERM ISOLATION SIMPLE: CPT

## 2024-06-20 ENCOUNTER — APPOINTMENT (OUTPATIENT)
Dept: HUMAN REPRODUCTION | Facility: CLINIC | Age: 39
End: 2024-06-20
Payer: COMMERCIAL

## 2024-06-20 PROCEDURE — S4042: CPT

## 2024-06-20 PROCEDURE — 76830 TRANSVAGINAL US NON-OB: CPT

## 2024-06-20 PROCEDURE — 36415 COLL VENOUS BLD VENIPUNCTURE: CPT

## 2024-06-20 PROCEDURE — 99213 OFFICE O/P EST LOW 20 MIN: CPT | Mod: 25

## 2024-07-01 ENCOUNTER — APPOINTMENT (OUTPATIENT)
Dept: HUMAN REPRODUCTION | Facility: CLINIC | Age: 39
End: 2024-07-01
Payer: COMMERCIAL

## 2024-07-01 PROCEDURE — 36415 COLL VENOUS BLD VENIPUNCTURE: CPT

## 2024-07-01 PROCEDURE — 76857 US EXAM PELVIC LIMITED: CPT

## 2024-07-01 PROCEDURE — 99213 OFFICE O/P EST LOW 20 MIN: CPT | Mod: 25

## 2024-07-03 ENCOUNTER — APPOINTMENT (OUTPATIENT)
Dept: HUMAN REPRODUCTION | Facility: CLINIC | Age: 39
End: 2024-07-03
Payer: COMMERCIAL

## 2024-07-03 PROCEDURE — 89261 SPERM ISOLATION COMPLEX: CPT

## 2024-07-03 PROCEDURE — 58322 ARTIFICIAL INSEMINATION: CPT

## 2024-07-03 PROCEDURE — 99459 PELVIC EXAMINATION: CPT

## 2024-07-03 PROCEDURE — 99213 OFFICE O/P EST LOW 20 MIN: CPT | Mod: 25

## 2024-07-17 ENCOUNTER — APPOINTMENT (OUTPATIENT)
Dept: HUMAN REPRODUCTION | Facility: CLINIC | Age: 39
End: 2024-07-17

## 2024-07-17 PROCEDURE — 76830 TRANSVAGINAL US NON-OB: CPT

## 2024-07-17 PROCEDURE — 99213 OFFICE O/P EST LOW 20 MIN: CPT | Mod: 25

## 2024-07-17 PROCEDURE — S4042: CPT | Mod: 59

## 2024-07-17 PROCEDURE — 36415 COLL VENOUS BLD VENIPUNCTURE: CPT

## 2024-07-19 ENCOUNTER — APPOINTMENT (OUTPATIENT)
Dept: HUMAN REPRODUCTION | Facility: CLINIC | Age: 39
End: 2024-07-19
Payer: COMMERCIAL

## 2024-07-19 PROCEDURE — 36415 COLL VENOUS BLD VENIPUNCTURE: CPT

## 2024-07-22 ENCOUNTER — APPOINTMENT (OUTPATIENT)
Dept: HUMAN REPRODUCTION | Facility: CLINIC | Age: 39
End: 2024-07-22
Payer: COMMERCIAL

## 2024-07-22 PROCEDURE — 99213 OFFICE O/P EST LOW 20 MIN: CPT | Mod: 25

## 2024-07-22 PROCEDURE — 76817 TRANSVAGINAL US OBSTETRIC: CPT

## 2024-07-22 PROCEDURE — 36415 COLL VENOUS BLD VENIPUNCTURE: CPT

## 2024-08-07 ENCOUNTER — APPOINTMENT (OUTPATIENT)
Dept: HUMAN REPRODUCTION | Facility: CLINIC | Age: 39
End: 2024-08-07

## 2024-08-07 PROCEDURE — 99214 OFFICE O/P EST MOD 30 MIN: CPT | Mod: 25

## 2024-08-07 PROCEDURE — 99459 PELVIC EXAMINATION: CPT

## 2024-08-07 PROCEDURE — 76817 TRANSVAGINAL US OBSTETRIC: CPT

## 2024-08-16 ENCOUNTER — APPOINTMENT (OUTPATIENT)
Dept: HUMAN REPRODUCTION | Facility: CLINIC | Age: 39
End: 2024-08-16
Payer: COMMERCIAL

## 2024-08-16 PROCEDURE — 76817 TRANSVAGINAL US OBSTETRIC: CPT

## 2024-08-16 PROCEDURE — 99214 OFFICE O/P EST MOD 30 MIN: CPT | Mod: 25
